# Patient Record
Sex: MALE | Race: WHITE | NOT HISPANIC OR LATINO
[De-identification: names, ages, dates, MRNs, and addresses within clinical notes are randomized per-mention and may not be internally consistent; named-entity substitution may affect disease eponyms.]

---

## 2021-08-10 ENCOUNTER — NON-APPOINTMENT (OUTPATIENT)
Age: 66
End: 2021-08-10

## 2021-08-10 ENCOUNTER — APPOINTMENT (OUTPATIENT)
Dept: ORTHOPEDIC SURGERY | Facility: CLINIC | Age: 66
End: 2021-08-10
Payer: MEDICARE

## 2021-08-10 VITALS
DIASTOLIC BLOOD PRESSURE: 80 MMHG | HEART RATE: 80 BPM | BODY MASS INDEX: 27.83 KG/M2 | SYSTOLIC BLOOD PRESSURE: 122 MMHG | WEIGHT: 210 LBS | OXYGEN SATURATION: 99 % | HEIGHT: 73 IN

## 2021-08-10 DIAGNOSIS — Z98.890 OTHER SPECIFIED POSTPROCEDURAL STATES: ICD-10-CM

## 2021-08-10 DIAGNOSIS — M54.16 RADICULOPATHY, LUMBAR REGION: ICD-10-CM

## 2021-08-10 PROCEDURE — 99204 OFFICE O/P NEW MOD 45 MIN: CPT

## 2021-08-10 RX ORDER — METHYLPREDNISOLONE 4 MG/1
4 TABLET ORAL
Qty: 1 | Refills: 0 | Status: ACTIVE | COMMUNITY
Start: 2021-08-10 | End: 1900-01-01

## 2021-08-10 NOTE — HISTORY OF PRESENT ILLNESS
[de-identified] : Mr. MCKAY is a very pleasant 66 year old male who complains of right sided lower back and hip pain since 7/1/21.  He reports that this radiates down his right thigh and right calf and foot.   This is similar in distribution to his lumbar radicular pain he had before a microdiscectomy at L5/S1 in 2001.  He has not had the radiating pain since the surgery until recently.  He has also been dealing with severe right knee pain, bilateral shoulder pain, neck pain, hand pain.  He has seen a rheumatologist in the past, with an equivocal workup.  On initial presentation symptoms were as follows: Patient described the pain as intermittent.  Patient rated the pain as moderate to severe.\par \par The patient reports no loss of hand dexterity.\par The patient states there no loss of balance when walking.\par There no sensory loss in the arms or legs\par \par The patient has no history of unexpected weight loss, no history of active cancer, no history bladder or bowel dysfunction, no night pain, no fevers or chills.\par \par The past medical history, surgical history, family history, allergies, medications, review of systems, family history and social history were reviewed and non contributory.

## 2021-08-10 NOTE — PHYSICAL EXAM
[de-identified] : Physical Exam:\par \par General: patient is well developed, well nourished, in no acute \par distress, alert and oriented x 3. \par \par Mood and affect: normal\par \par Respiratory: no respiratory distress noted\par \par Skin: well healed\par \par Alignment:The spine is well compensated in the coronal and sagittal plane.  There is no asymmetry on the fofana forward bend test\par \par Gait: The patient is able to toe walk and heel walk without difficulty. The patient is able to tandem gait without difficutly.\par \par Palpation: no tenderness to palpation spine or paraspinal region\par \par Range of motion: Lumbar spine ROM is restricted\par \par Neurologic Exam:\par Motor: Manual Muscle testing in the lower extremities is 5 out of 5 in all muscle groups. There is no evidence of muscular atrophy in the lower extremities. Sensory: Sensation to light touch is grossly intact in the lower extremities\par \par Reflexes: DTR are present and symmetric throughout, negative vásquez bilaterally, negative inverted radial reflex bilaterally, no clonus, plantar responses are flexor\par \par Hip Exam: No pain with internal or external rotation of hips bilaterally\par \par Special tests: Straight leg raise test negative.  Cross straight leg test negative.  KAREN test negative\par \par Vascular: Examination of the peripheral vascular system demonstrates no evidence of congestion or edema. no evidence of lymphadema bilateral lower extremities, pulses are present and symmetric in both lower extremities. [de-identified] : none

## 2021-08-10 NOTE — DISCUSSION/SUMMARY
[de-identified] : -mri lumbar, xrays lumbar\par -medrol dose pack (will hold meloxicam while on taper)\par -see Dr. Gupta for rheumatology evaluation\par -f/u after imaging

## 2021-08-13 ENCOUNTER — APPOINTMENT (OUTPATIENT)
Dept: MRI IMAGING | Facility: CLINIC | Age: 66
End: 2021-08-13
Payer: MEDICARE

## 2021-08-13 ENCOUNTER — OUTPATIENT (OUTPATIENT)
Dept: OUTPATIENT SERVICES | Facility: HOSPITAL | Age: 66
LOS: 1 days | End: 2021-08-13
Payer: MEDICARE

## 2021-08-13 ENCOUNTER — TRANSCRIPTION ENCOUNTER (OUTPATIENT)
Age: 66
End: 2021-08-13

## 2021-08-13 DIAGNOSIS — Z98.89 OTHER SPECIFIED POSTPROCEDURAL STATES: Chronic | ICD-10-CM

## 2021-08-13 DIAGNOSIS — D17.20 BENIGN LIPOMATOUS NEOPLASM OF SKIN AND SUBCUTANEOUS TISSUE OF UNSPECIFIED LIMB: Chronic | ICD-10-CM

## 2021-08-13 DIAGNOSIS — M54.16 RADICULOPATHY, LUMBAR REGION: ICD-10-CM

## 2021-08-13 PROCEDURE — 72148 MRI LUMBAR SPINE W/O DYE: CPT | Mod: 26,MH

## 2021-08-13 PROCEDURE — 72148 MRI LUMBAR SPINE W/O DYE: CPT

## 2021-08-20 ENCOUNTER — NON-APPOINTMENT (OUTPATIENT)
Age: 66
End: 2021-08-20

## 2022-09-08 ENCOUNTER — APPOINTMENT (OUTPATIENT)
Dept: ORTHOPEDIC SURGERY | Facility: CLINIC | Age: 67
End: 2022-09-08

## 2022-09-08 ENCOUNTER — NON-APPOINTMENT (OUTPATIENT)
Age: 67
End: 2022-09-08

## 2022-09-08 DIAGNOSIS — M17.11 UNILATERAL PRIMARY OSTEOARTHRITIS, RIGHT KNEE: ICD-10-CM

## 2022-09-08 DIAGNOSIS — M25.561 PAIN IN RIGHT KNEE: ICD-10-CM

## 2022-09-08 DIAGNOSIS — M25.551 PAIN IN RIGHT HIP: ICD-10-CM

## 2022-09-08 PROCEDURE — 73502 X-RAY EXAM HIP UNI 2-3 VIEWS: CPT | Mod: RT

## 2022-09-08 PROCEDURE — 20610 DRAIN/INJ JOINT/BURSA W/O US: CPT | Mod: RT

## 2022-09-08 PROCEDURE — 73564 X-RAY EXAM KNEE 4 OR MORE: CPT | Mod: RT

## 2022-09-08 PROCEDURE — 99214 OFFICE O/P EST MOD 30 MIN: CPT | Mod: 25

## 2022-09-08 RX ORDER — INFLIXIMAB 100 MG/10ML
INJECTION, POWDER, LYOPHILIZED, FOR SOLUTION INTRAVENOUS
Refills: 0 | Status: ACTIVE | COMMUNITY

## 2022-09-08 RX ORDER — HYALURONATE SOD, CROSS-LINKED 30 MG/3 ML
30 SYRINGE (ML) INTRAARTICULAR
Refills: 0 | Status: COMPLETED | OUTPATIENT
Start: 2022-09-08

## 2022-09-08 RX ADMIN — Medication 0 MG/3ML: at 00:00

## 2022-09-08 NOTE — HISTORY OF PRESENT ILLNESS
[de-identified] : KALYANI MCKAY  is a 67 year year old M with a history of psoriatic arthritis who presents with right knee pain for the past few years. He reports that the pain came on insidiously over the past few years. He describes it as a dull aching type pain approximately 4-7 out of 10, which worsens with running and stairs and is relieved with rest has been previously seen for the right knee and had. He has been previously seen for the right knee and initially had a steroid injection which he says did not help very much, then had a series of gel injections last year which he says helped a good deal and lasted about 6 months.  He does have psoriatic arthritis for which he is on Remicade injections about every 8 weeks.  He says that the right knee pain overall is tolerable, but has been increasing a little bit so that it affects him on stairs now.  He does not have pain when just walking on flat surfaces.

## 2022-09-08 NOTE — DISCUSSION/SUMMARY
[de-identified] : Williams has arthritis of his bilateral knees, mostly in the medial compartment.  At this point he is only symptomatic on the right side and says that overall his pain is tolerable, but it is starting to limit some of his activities such as stairs and running.  We had a long discussion about the treatment options today including nonoperative management versus surgical treatment with a total knee replacement.  Given the fact that he had a great response to the gel injection last year which lasted him about 6 months and he says that overall his symptoms are tolerable, we will proceed with another gel injection today.  We discussed that since he is on Remicade for the psoriatic arthritis, should he need a knee replacement in the future, he will have to discuss with his rheumatologist a plan to either delay the total knee until after his Remicade treatments are complete or to temporarily stop his Remicade in the perioperative period.  He has an injection scheduled for next week and is can schedule a rheumatology appointment shortly after that.  In the meantime we will also give him a physical therapy prescription for him to at least learn some exercises that he can also do at home.  He voiced understanding of the diagnosis and treatment plan and is in agreement.  We will have him follow-up in 6 weeks for repeat evaluation

## 2022-09-08 NOTE — PROCEDURE
[Injection] : Injection [Right] : of the right [Knee Joint] : knee joint [Osteoarthritis] : Osteoarthritis [Patient] : patient [Risk] : risk [Benefits] : benefits [Verbal Consent Obtained] : verbal consent was obtained prior to the procedure [Alcohol] : Alcohol [Ethyl Chloride Spray] : ethyl chloride spray was used as a topical anesthetic [Lateral] : lateral [22] : a 22-gauge [Bandage Applied] : a bandage [Tolerated Well] : The patient tolerated the procedure well [None] : none [___ Week(s)] : in [unfilled] week(s) [FreeTextEntry8] : Gel one

## 2022-09-08 NOTE — PHYSICAL EXAM
[de-identified] : General: No apparent distress\par Cardiovascular: extremities warm and well-perfused, no cyanosis\par Pulmonary: non labored respirations\par \par Musculoskeletal:\par \par Right hip: \par Gait: Normal\par Tenderness: None\par Adductor strength: 5 out of 5\par Abductor strength: 5 out of 5\par Hip Flexion: 100\par FADIR: Negative\par \par Left knee:\par Skin is intact, warm, and dry\par Motor: + EHL/FHL/TA/GS\par Sensory: + s/s/sp/dp/t distributions\par Extremity warm and well-perfused\par \par Disuse atrophy: None\par Effusion: None\par \par Active ROM: 0 to 130 degrees of flexion\par Passive ROM: 0 to 130 degrees flexion\par Crepitation: Mild\par \par Joint Line tenderness: None\par Patellar facet tenderness: None\par Patellar tracking: Normal\par \par Alignment: Varus\par Gait: Normal\par \par Right Knee: \par Skin is intact, warm, and dry\par Motor: + EHL/FHL/TA/GS\par Sensory: + s/s/sp/dp/t distributions\par Extremity warm and well-perfused\par \par Disuse atrophy: None\par Effusion: None\par \par Active ROM: 0 130 degrees of flexion\par Passive ROM: 0 130 degrees of flexion\par Crepitation: Mild\par \par Joint Line tenderness: Medial joint line tenderness\par Patellar facet tenderness: Medial and lateral facet tenderness\par \par Ligamentous Exam:\par Negative anterior drawer\par Negative Lachman\par \par Alignment: Varus\par Gait: Normal\par \par  [de-identified] : Right knee x-rays 4 views obtained today: Demonstrate severe arthritis of the medial compartment of the bilateral knees with loss of joint space \par AP pelvis obtained today demonstrates mild arthritis of the bilateral hips

## 2022-11-02 ENCOUNTER — APPOINTMENT (OUTPATIENT)
Dept: ORTHOPEDIC SURGERY | Facility: CLINIC | Age: 67
End: 2022-11-02

## 2022-11-02 VITALS — DIASTOLIC BLOOD PRESSURE: 78 MMHG | SYSTOLIC BLOOD PRESSURE: 116 MMHG | HEART RATE: 85 BPM

## 2022-11-02 PROCEDURE — 20610 DRAIN/INJ JOINT/BURSA W/O US: CPT

## 2022-11-02 PROCEDURE — 99214 OFFICE O/P EST MOD 30 MIN: CPT | Mod: 25

## 2022-11-02 NOTE — DISCUSSION/SUMMARY
[de-identified] : Right knee arthritis.  I discussed with him that the gel injection appears to be working quite well as the medial knee pain that he was having is much improved.  I discussed with him that the lateral joint line pain that he is having could be a meniscus tear of the lateral meniscus.  Given his severe arthritis, I would not recommend further imaging or surgery for this.  The pain that he experiences that radiates down his lower leg is likely nerve pain related to the fact that he has sciatica and likely an irritated superficial peroneal nerve.  I discussed with him that we can continue to treat his knee arthritis nonoperatively.  He is going to [place] next week and plans to do a good amount of golfing, so he is interested in having a steroid injection today.  Please see procedure note for details.  He will continue the physical therapy and may use Mobic and Tylenol for pain control.  I also had a long discussion with him about knee replacements.  At this point, he does have severe arthritis on x-ray, so if his symptoms should worsen and he is unable to do most of the activities that he enjoys doing, he would be a candidate for a knee replacement.  However, we discussed that he is still able to be quite active and do all the activities that he enjoys doing such as cycling and pickleball, so we decided that at this point he is not ready for a knee replacement yet.  We discussed that its difficult to know at what point in the future he may be ready for knee replacement, it could be soon versus a few years from now or longer.  I will see him back in 6 weeks for repeat evaluation.  The patient expressed understanding of the diagnosis and treatment plan and all questions were answered.\par \par This note was generated using dragon medical dictation software.  A reasonable effort has been made for proofreading its contents, but typos may still remain.  If there are any questions or points of clarification needed please notify my office.

## 2022-11-02 NOTE — PHYSICAL EXAM
[de-identified] : General: No apparent distress\par Cardiovascular: extremities warm and well-perfused, no cyanosis\par Pulmonary: non labored respirations\par \par Right knee:\par Skin intact, warm, dry\par No effusion\par Range of motion: 0 to 130 degrees of flexion\par No medial joint line tenderness to palpation\par Lateral joint  to palpation\par Increase sensitivity of the anterior lateral lower leg in the superficial peroneal nerve distribution\par Motor and sensory intact distally, toes warm and well-perfused

## 2022-11-02 NOTE — HISTORY OF PRESENT ILLNESS
[de-identified] : KALYANI MCKAY  is a 67 year year old M who presents for follow-up of his right knee pain.  He was seen about 2 months ago when he was given a gel injection into the right knee.  He says that the medial side knee pain that he was having is significantly better.  He is also been going to physical therapy which she says helps quite a bit as well.  He has developed some lateral sided knee pain since the last visit.  He says its mostly at the knee but he does feel some radiation of the pain down the anterolateral aspect of the lower leg.  He says the pain is about a 5 out of 10.  He feels it when he is sitting or driving for a long period of time.  He has had issues with sciatica in the past.  Overall he has been able to do most of the activities that he enjoys doing such as walking and playing golf.  He does feel little bit of discomfort in the knee with going up and down stairs.

## 2022-12-14 ENCOUNTER — APPOINTMENT (OUTPATIENT)
Dept: ORTHOPEDIC SURGERY | Facility: CLINIC | Age: 67
End: 2022-12-14

## 2022-12-14 VITALS — HEART RATE: 86 BPM | SYSTOLIC BLOOD PRESSURE: 114 MMHG | DIASTOLIC BLOOD PRESSURE: 75 MMHG

## 2022-12-14 PROCEDURE — 99214 OFFICE O/P EST MOD 30 MIN: CPT | Mod: 25

## 2022-12-14 PROCEDURE — 20610 DRAIN/INJ JOINT/BURSA W/O US: CPT | Mod: RT

## 2022-12-14 RX ORDER — METHYLPREDNISOLONE 4 MG/1
4 TABLET ORAL
Qty: 1 | Refills: 0 | Status: ACTIVE | COMMUNITY
Start: 2022-12-14 | End: 1900-01-01

## 2022-12-14 RX ORDER — HYALURONATE SOD, CROSS-LINKED 30 MG/3 ML
30 SYRINGE (ML) INTRAARTICULAR
Refills: 0 | Status: COMPLETED | OUTPATIENT
Start: 2022-12-14

## 2022-12-14 RX ADMIN — Medication 0 MG/3ML: at 00:00

## 2022-12-14 NOTE — PHYSICAL EXAM
[de-identified] : General: No apparent distress\par Cardiovascular: extremities warm and well-perfused, no cyanosis\par Pulmonary: non labored respirations\par \par Right knee:\par Tender to palpation of medial joint line\par Mild effusion\par Range of motion: 0 to 120 degrees of flexion\par Varus angulation of the knee\par \par Lumbar spine:\par 5/5 EHL/FHL/TA/GS/hip flexors\par SILT in all distributions\par negative SLR\par \par

## 2022-12-14 NOTE — DISCUSSION/SUMMARY
[de-identified] : Right knee arthritis.  I had a long discussion with the patient about his knee and symptoms that he is having.  Surgery versus nonoperative management was discussed in detail.  Surgery would include total knee replacement.  I discussed with him that the decision for surgery would be based on his symptoms as his x-ray does demonstrate severe arthritis of the knee.  Once he feels that the symptoms of his knee are significantly impacting his quality of life and that he does not want to continue with nonoperative management, we can then proceed with a knee replacement.  He says that he would like to hold off on a knee replacement until at least the summertime.  He would like to continue with nonoperative management.  He was interested in another gel injection today as the previous one helped him a significant amount.  Please see procedure details for injection details.  I also put in for a new prescription for physical therapy which she says is helped him quite a bit.  I also put in for a Medrol Dosepak such that if he has another flareup of his sciatica he can take this, after he checks with his rheumatologist to make sure that it is compatible with the medications that he is taking.  I will see him back in 3 months for repeat evaluation.  The patient expressed understanding of his diagnosis and treatment plan and all questions were answered.\par \par This note was generated using dragon medical dictation software.  A reasonable effort has been made for proofreading its contents, but typos may still remain.  If there are any questions or points of clarification needed please notify my office.

## 2022-12-14 NOTE — HISTORY OF PRESENT ILLNESS
[de-identified] : KALYANI MCKAY  is a 67 year year old M who presents for follow-up of his right knee.  He was seen last on November 2 when he was given a cortisone injection into the knee.  He says that after the cortisone injection his knee felt very good and he had really minimal pain in the knee.  He then went to [place] and played a few rounds of golf.  He says that after this he actually had a flareup of sciatica which she has had in the past.  He said that it was very significant and prevented him from doing much walking or many activities when he was down there.  He says that his knee also felt like it flared up during this time and until few days ago was quite severe as well.  He says that currently he does not really have any pain in the knee, but says that the pain can be as bad as 4 out of 10 right now.  He had stopped physical therapy because of the sciatica and wants to resume it again.

## 2022-12-14 NOTE — PROCEDURE
[de-identified] : Procedure: Injection of the right knee. \par Indication:  knee pain. \par Risk and benefits were discussed with the patient. \par Alcohol was used to prep the area. ethyl chloride spray was used as a topical anesthetic. Using sterile technique, the aspiration/injection needle was then directed into the knee. A 22-gauge was used to inject Hyaluronic acid Gel-One. A bandage was applied. The patient tolerated the procedure well. \par Complications: none. Post injection instructions provided\par \par

## 2023-03-22 ENCOUNTER — APPOINTMENT (OUTPATIENT)
Dept: ORTHOPEDIC SURGERY | Facility: CLINIC | Age: 68
End: 2023-03-22

## 2023-07-21 ENCOUNTER — APPOINTMENT (OUTPATIENT)
Dept: ORTHOPEDIC SURGERY | Facility: CLINIC | Age: 68
End: 2023-07-21
Payer: MEDICARE

## 2023-07-21 PROCEDURE — 99214 OFFICE O/P EST MOD 30 MIN: CPT

## 2023-07-21 NOTE — PHYSICAL EXAM
[de-identified] : General: No apparent distress\par Cardiovascular: extremities warm and well-perfused, no cyanosis\par Pulmonary: non labored respirations\par \par \par Right hip:\par No pain with hip range of motion\par \par Right knee:\par Skin is intact, warm, and dry\par Motor and sensory intact, lower extremity warm and well-perfused\par \par Disuse atrophy: None\par Effusion: Mild\par \par Active ROM: 0 to 115 degrees of flexion\par Passive ROM: Same as active\par Crepitation: None\par \par Joint Line tenderness: Medial joint line tenderness to palpation\par Patellar facet tenderness: None\par Patellar tracking: Midline\par Patellar mobility/apprehension: Normal/none\par \par Ligamentous Exam:\par Negative anterior drawer, negative posterior drawer, stable to varus and valgus stress at 0 and 30 degrees of flexion\par \par Alignment: Varus\par \par \par Left hip:\par No pain with hip range of motion\par \par Left knee: \par Skin is intact, warm, and dry\par Motor and sensory intact, lower extremity warm and well-perfused\par \par Disuse atrophy: None\par Effusion: None\par \par Active ROM: 0 to 130 degrees of flexion\par Passive ROM: Same as active\par Crepitation: None\par \par Joint Line tenderness: Medial joint line tenderness to palpation\par Patellar facet tenderness: None\par Patellar tracking: Midline\par Patellar mobility/apprehension: Normal/none\par \par Ligamentous Exam:\par Negative anterior drawer, negative posterior drawer, stable to varus and valgus stress at 0 and 30 degrees of flexion\par \par Alignment: Varus [de-identified] : X-rays performed in December demonstrate severe osteoarthritis of the right knee, mostly in the medial compartment, and moderate degenerative changes about the patellofemoral compartment as well

## 2023-07-21 NOTE — HISTORY OF PRESENT ILLNESS
[de-identified] : KALYANI MCKAY  is a 68 year year old M who presents for follow-up of his right knee.  He was last seen in December when he was given a gel injection into his right knee.  He says that this did help him initially.  However after a few months he began experiencing the pain in his right knee again.  He again localizes the pain to the medial aspect of the knee.  He says that while initially the pain did not limit him, now he finds it difficult to walk for long distances and yesterday says that he was unable to walk more than half a block before he had significant pain and had to turn around.  He also reports that the sciatica pain that he was having at the last visit is also improved.  Currently his only issue is his knees.  He also went to physical therapy which he says did improve the pain initially, but it did return.

## 2023-07-21 NOTE — DISCUSSION/SUMMARY
[de-identified] : Right knee arthritis.  At this point, he has tried a significant trial of nonoperative treatment.  The pain has persisted and is affecting his daily function and ability to do the activities that he enjoys doing.  We discussed that treatment options would include nonoperative versus operative treatment.  With continued nonoperative treatment, he could try continued injections and physical therapy.  Since he has tried this already and it has not provided him with lasting pain relief, he does not want to continue with nonoperative treatment.  We discussed operative treatment which would include a total knee replacement.  We discussed the details of the surgery and how it is performed as well as the implant that is used.  We also discussed the expected postoperative rehabilitation.  We discussed that it could take 6 months or longer for full recovery.  Also discussed risks of surgery including but not limited to infection, bleeding, damage to nerves and blood vessels, need for further surgery, periprosthetic fracture, after this discussion, the patient elected to proceed with surgery as outlined above.  He will work with my office to obtain any medical clearance necessary as well as any presurgical testing.  We will plan for surgery in September.  He was given the opportunity to ask questions and they were all answered to the best of my ability.\par \par  the patient expressed understanding of his diagnosis and treatment plan and all questions were answered.\par \par This note was generated using dragon medical dictation software.  A reasonable effort has been made for proofreading its contents, but typos may still remain.  If there are any questions or points of clarification needed please notify my office.

## 2024-07-26 ENCOUNTER — EMERGENCY (EMERGENCY)
Facility: HOSPITAL | Age: 69
LOS: 1 days | Discharge: HOME | End: 2024-07-26
Attending: STUDENT IN AN ORGANIZED HEALTH CARE EDUCATION/TRAINING PROGRAM | Admitting: STUDENT IN AN ORGANIZED HEALTH CARE EDUCATION/TRAINING PROGRAM
Payer: MEDICARE

## 2024-07-26 VITALS
WEIGHT: 210.1 LBS | HEIGHT: 72 IN | HEART RATE: 73 BPM | SYSTOLIC BLOOD PRESSURE: 137 MMHG | RESPIRATION RATE: 17 BRPM | OXYGEN SATURATION: 98 % | DIASTOLIC BLOOD PRESSURE: 86 MMHG

## 2024-07-26 DIAGNOSIS — L72.9 FOLLICULAR CYST OF THE SKIN AND SUBCUTANEOUS TISSUE, UNSPECIFIED: Chronic | ICD-10-CM

## 2024-07-26 DIAGNOSIS — Z98.89 OTHER SPECIFIED POSTPROCEDURAL STATES: Chronic | ICD-10-CM

## 2024-07-26 DIAGNOSIS — D17.20 BENIGN LIPOMATOUS NEOPLASM OF SKIN AND SUBCUTANEOUS TISSUE OF UNSPECIFIED LIMB: Chronic | ICD-10-CM

## 2024-07-26 PROCEDURE — 99283 EMERGENCY DEPT VISIT LOW MDM: CPT

## 2024-07-26 PROCEDURE — 99282 EMERGENCY DEPT VISIT SF MDM: CPT

## 2024-07-26 NOTE — ED PROVIDER NOTE - CARE PROVIDER_API CALL
Goldberg, Gary David  Urology  [address] [place] [address], [address]  [place] [address]  Phone: [phone]  Fax: [phone]  Follow Up Time:

## 2024-07-26 NOTE — ED PROVIDER NOTE - CLINICAL SUMMARY MEDICAL DECISION MAKING FREE TEXT BOX
69 year old male p/w bleeding from surgical incision.  He had a spermatocelectomy 8 days ago.  He noted bleeding from the incision along his scrotum tonight.  No pain, fever, wound discharge.  Mild bleeding at this time.  Clean wound, steri-strips

## 2024-07-26 NOTE — ED PROVIDER NOTE - NSICDXPASTMEDICALHX_GEN_ALL_CORE_FT
PAST MEDICAL HISTORY:  BPH (benign prostatic hyperplasia)     GERD (gastroesophageal reflux disease)     Gout     Hiatal hernia     HTN (hypertension) X 10 years, controlled    Renal calculus x 2 years,  s/p ESWL 12/2013  s/p left ureteral stent 1/2014

## 2024-07-26 NOTE — ED ADULT NURSE REASSESSMENT NOTE - NS ED NURSE REASSESS COMMENT FT1
pt seen and examined by MD. steri-stirps applied to suture line. bleeding controlled. pt given ice packs. instructed on s/s of infection. encouraged to follow up with surgeon and to return for any worsening symptoms or concerns

## 2024-07-26 NOTE — ED PROVIDER NOTE - OBJECTIVE STATEMENT
69-year-old male with a history of BPH, hiatal hernia, HTN presents with postoperative bleeding.  Patient states that 8 days ago he had a spermatocelectomy to remove a spermatocele on his epididymis.  The procedure was done at [facility].  Patient followed up with his surgeon 2 days ago and was healing well.  He had dissolvable sutures and glue placed on his scrotum.  Tonight while driving home, he noted bleeding from the incision site.  He attempted to compress the area and applied an ice pack which provided some relief.  He denies pain, fever, dizziness, wound discharge or odor. He is not currently on any antibiotics.  Surgeon Dr. Goldberg 69-year-old male with a history of BPH, hiatal hernia, HTN presents with postoperative bleeding.  Patient states that 8 days ago he had a spermatocelectomy to remove a spermatocele on his epididymis.  The procedure was done at [facility].  He had dissolvable sutures and glue placed on his scrotum.  Patient followed up with his surgeon 2 days ago and was healing well.   Tonight while driving home, he noted bleeding from the incision site.  He attempted to compress the area and applied an ice pack which provided some relief.  He denies pain, fever, dizziness, wound discharge or odor. He is not currently on any antibiotics.  He was given a prescription for PO Toradol, took his first dose earlier today.  No AC use. Surgeon Dr. Goldberg

## 2024-07-26 NOTE — ED PROVIDER NOTE - NSICDXPASTSURGICALHX_GEN_ALL_CORE_FT
PAST SURGICAL HISTORY:  Lipoma of lower extremity removal    S/P lumbar discectomy 2001    S/P tonsillectomy     Scrotal cyst

## 2024-07-26 NOTE — ED PROVIDER NOTE - NSFOLLOWUPINSTRUCTIONS_ED_ALL_ED_FT
Please follow up with our surgeon.  Keep you wound clean and dry.  Return to the ER for persistent bleeding, pain, fever, redness, or any other concerns.     Uncontrolled Wound Bleeding    Uncontrolled bleeding can result from many causes. It can happen any time, especially for people who have an increased risk of bleeding. Uncontrolled bleeding can cause you to become confused or unconscious. It can also lead to shock or even be life-threatening. Call emergency services (911 in the [place] at the first sign of uncontrolled bleeding. Signs of uncontrolled bleeding include:    Bleeding that does not stop even after applying direct pressure or using other techniques to stop it.  Bleeding that spurts from a wound.   Bleeding that pools in and around a wound and causes increased swelling.  Bleeding that soaks through a dressing or clothing despite measures to stop it.    If you are at risk for uncontrolled bleeding, you should take precautions to protect yourself from cuts and other injuries that can cause bleeding. You must also know how to stop bleeding if it occurs.    Do I have an increased risk for uncontrolled bleeding?  You may be at greater risk for uncontrolled bleeding if:    You have had a recent bleeding injury treated at an urgent care center or emergency room, especially if the injury is in an area of the body with a lot of blood vessels or large blood vessels.  You have a bleeding disorder, such as hemophilia or von Willebrand disease.  You are on a blood-thinning medicine (anticoagulant), such as warfarin.  You take medicines such as aspirin and ibuprofen. These medicines can also thin your blood.  You are on chemotherapy. Many chemotherapy medicines can decrease your body's normal blood-clotting ability.  You have liver or kidney disease.    How to prevent bleeding  Take these precautions to help prevent bleeding:    Be very careful when using knives, scissors, or other sharp objects.   Use an electric razor instead of a blade.   Do not use toothpicks.  Use a soft-bristled toothbrush. Brush your teeth gently.   Always wear shoes outdoors and wear slippers indoors.   Be careful when cutting your fingernails and toenails.   Place bath mats in the bathroom. If possible, install handrails as well.   Wear gloves while you do yard work.   Wear your seat belt.   Prevent falls by removing loose rugs and extension cords from areas where you walk. Use a cane or walker if you need it.   Avoid constipation by:     Drinking enough fluid to keep your urine pale yellow.  Eating foods that are high in fiber, such as beans, whole grains, and fresh fruits and vegetables.  Limiting foods that are high in fat and processed sugars, such as fried or sweet foods.  Do not play contact sports or participate in other activities that have a high risk for injury.    How to stop bleeding     If you are at risk for uncontrolled bleeding, ask your health care provider to help you assemble a first aid kit to control bleeding. Learn to use the supplies in your kit, and keep it handy at all times.    If someone is available to help when you have uncontrolled bleeding, ask the person to assist you and stay with you until emergency services arrive.    Follow these steps to stop bleeding:     Find a safe place where you can remain still and calm. Lie down if possible.   Find the source of the bleeding.   Remove clothing over the wound to expose the area.   If possible, position yourself so that the body part with the wound is raised (elevated) above the level of your heart.   If you have a first aid kit, place a gauze pad from the kit over the bleeding area. If you do not have a kit, use any clean towel or cloth.   Put hard pressure over the wound. The smaller the wound, the smaller the area of pressure should be. Using the smallest surface possible generates more force directly onto the wound and is more efficient at controlling the bleeding. Maintain pressure until help arrives.    If the wound is small, use your fingers to maintain direct pressure only on the wound as opposed to using your entire palm.  If the wound is larger, use a larger surface area to apply pressure. This may involve using one or both of your palms.  If the wound is large and gaping, wipe away any pooled blood and pack the wound with packing gauze from your bleeding kit or with a clean towel or cloth. Put pressure over the packed wound with both hands until emergency services arrive.  For severe uncontrolled bleeding from an arm or leg, apply a tourniquet from your bleeding kit about 2–3 inches above the wound. Tighten the tourniquet until bleeding stops. Secure the tourniquet, and write down the time you placed it. You may still place pressure over the wound.    Follow these instructions at home:  Take over-the-counter and prescription medicines only as told by your health care provider.  If you are taking blood thinners:    Talk with your health care provider before you take any medicines that contain aspirin or NSAIDs. These medicines increase your risk for dangerous bleeding.   Take your medicine exactly as told, at the same time every day.   Wear a medical alert bracelet or carry a card that lists what medicines you take.  Keep all follow-up visits as told by your health care provider. This is important.    Contact a health care provider if you:  Have menstrual bleeding that is heavier than normal.  Have bloody or brown urine.  Have easy bruising.  Have stool that is black, tarry, bright red, or maroon.  Vomit material that is dark brown, black, or red.  Feel weak or dizzy.    Get help right away if you:  Have bleeding that does not stop despite applying first aid measures.  Have sudden, severe bleeding.  Have chest pain.  Have shortness of breath.  Faint.    Summary  Call emergency services (911 in the [place] at the first sign of uncontrolled bleeding.  Signs of uncontrolled bleeding include bleeding that will not stop or bleeding that spurts, pools, or soaks through a dressing.  Follow the steps to help stop bleeding until help arrives.  If someone is available to help when you have uncontrolled bleeding, ask the person to assist you and stay with you until emergency services arrive.    ADDITIONAL NOTES AND INSTRUCTIONS    Please follow up with your Primary MD in 24-48 hr.  Seek immediate medical care for any new/worsening signs or symptoms.

## 2024-07-26 NOTE — ED PROVIDER NOTE - CARE PROVIDERS DIRECT ADDRESSES
[email or web address]

## 2024-07-26 NOTE — ED PROVIDER NOTE - PROGRESS NOTE DETAILS
Steri-strips applied to incision site.  No bleeding.  Patient advised to keep the wound dry for 1-2 days and call this surgeon in the AM,  He has a follow up appointment again next week. Return precautions discussed.  He expressed understanding of discharge instructions Wife at bedside to take patient home.

## 2024-07-26 NOTE — ED PROVIDER NOTE - PATIENT PORTAL LINK FT
You can access the FollowMyHealth Patient Portal offered by [facility] by registering at the following website: [email or web address]. By joining [employer]’s FollowMyHealth portal, you will also be able to view your health information using other applications (apps) compatible with our system.

## 2024-07-26 NOTE — ED PROVIDER NOTE - GENITOURINARY, MLM
No discharge, lesions.  8 cm linear scrotal incision with scant bleeding .  2 dissolvable sutures remain.  No discharge from incision.  No surrounding erythema, edema, or tenderness

## 2024-07-26 NOTE — ED ADULT NURSE NOTE - OBJECTIVE STATEMENT
pt to ED reports s/p scrotal surgery this past Thursday; he was getting into his car and he started bleeding from the suture line. upon arrival to ED bleeding controlled. suture line intact. pt denies pain presently